# Patient Record
Sex: FEMALE | Race: WHITE | NOT HISPANIC OR LATINO | Employment: FULL TIME | URBAN - METROPOLITAN AREA
[De-identification: names, ages, dates, MRNs, and addresses within clinical notes are randomized per-mention and may not be internally consistent; named-entity substitution may affect disease eponyms.]

---

## 2024-03-21 ENCOUNTER — OFFICE VISIT (OUTPATIENT)
Dept: UROLOGY | Facility: CLINIC | Age: 29
End: 2024-03-21
Payer: COMMERCIAL

## 2024-03-21 DIAGNOSIS — R31.9 HEMATURIA, UNSPECIFIED TYPE: Primary | ICD-10-CM

## 2024-03-21 PROCEDURE — 99213 OFFICE O/P EST LOW 20 MIN: CPT | Performed by: NURSE PRACTITIONER

## 2024-03-21 PROCEDURE — 81001 URINALYSIS AUTO W/SCOPE: CPT

## 2024-03-21 PROCEDURE — 87086 URINE CULTURE/COLONY COUNT: CPT

## 2024-03-21 NOTE — PROGRESS NOTES
[facility]  Outpatient Clinic Note    Subjective   Vicki Garcia is a 28 y.o. female NPV    History of Present Illness   Last Urology visit with Dr. Perry 08/12/2021. History of chronic pelvic pain, microscopic hematuria with benign workup, and recurrent UTI symptoms.  Previously prescribed Nitrofurantoin 100 mg daily x 3 months for suppression therapy. She has taken AZO   In the past for symptom relief. EMR reviewed, No record of Positive Urine Cultures   Sexually active, has IUD with occasional spotting. Chronic pelvic, bladder pain is affecting sexual relations.  Urinalysis today shows Moderate blood    Past Medical History and Surgical History   No past medical history on file.  No past surgical history on file.    Medications  No current outpatient medications on file prior to visit.     No current facility-administered medications on file prior to visit.       Objective   Physicial Exam  General: Well developed, well nourished, alert and cooperative, appears in no acute distress  Eyes: Non-injected conjunctiva, sclera clear, no proptosis  Cardiac: Extremities are warm and well perfused. No edema, cyanosis or pallor.   Lungs: Breathing is easy, non-labored. Speaking in clear and complete sentences. Normal diaphragmatic movement.  MSK: Ambulatory with steady gait, unassisted  Neuro: alert and oriented to person, place and time  Psych: Demonstrates good judgement and reason, without hallucinations, abnormal affect or abnormal behaviors.  Skin: no obvious lesions, no rashes.    Review of Systems  All other systems have been reviewed and are negative for complaint.      Assessment and Plan   Last Urology visit with Dr. Perry 08/12/2021. History of chronic pelvic pain, microscopic hematuria with benign workup, and recurrent UTI symptoms.  Previously prescribed Nitrofurantoin 100 mg daily x 3 months for suppression therapy. She has taken AZO   In the past for symptom relief. EMR reviewed, No record of  Positive Urine Cultures   Sexually active, has IUD with occasional spotting. Chronic pelvic, bladder pain is affecting sexual relations.  Urinalysis today shows Moderate blood    According to the [employer], microscopic hematuria is defined by the presence of three or more red blood cells per high-powered field on microscopic examination of one properly collected, non-contaminated urinalysis with no evidence of infection.    We will send your urine today for microscopic urinalysis and you will be contacted with the results. If microscopic urinalysis reveals true microscopic hematuria, we will plan for a diagnostic hematuria workup including: urine culture, cytology, CT Urogram and cystoscopy. Patient is aware of the risks associated with IV contrast. There is no history of renal dysfunction. Risks of cystoscopy were discussed with the patient in great detail, including risk of hematuria, UTI and discomfort.    She declines further imaging and would like to defer cystoscopy at this time  Patient would like to establish care with Pallavi Byers CNP (prefers female) for further evaluation     All questions and concerns were addressed. Patient verbalizes understanding and has no other questions at this time.   If you have any questions about your care, do not hesitate to call and leave a message, we return calls in a timely manner.    Clare Dominguez-- GABRIELLE MURRELL  Office Phone:  [phone]

## 2024-03-22 LAB
APPEARANCE UR: ABNORMAL
BACTERIA #/AREA URNS AUTO: ABNORMAL /HPF
BILIRUB UR STRIP.AUTO-MCNC: NEGATIVE MG/DL
COLOR UR: ABNORMAL
GLUCOSE UR STRIP.AUTO-MCNC: NORMAL MG/DL
KETONES UR STRIP.AUTO-MCNC: NEGATIVE MG/DL
LEUKOCYTE ESTERASE UR QL STRIP.AUTO: NEGATIVE
MUCOUS THREADS #/AREA URNS AUTO: ABNORMAL /LPF
NITRITE UR QL STRIP.AUTO: NEGATIVE
PH UR STRIP.AUTO: 5.5 [PH]
PROT UR STRIP.AUTO-MCNC: ABNORMAL MG/DL
RBC # UR STRIP.AUTO: ABNORMAL /UL
RBC #/AREA URNS AUTO: ABNORMAL /HPF
SP GR UR STRIP.AUTO: 1.01
SQUAMOUS #/AREA URNS AUTO: ABNORMAL /HPF
UROBILINOGEN UR STRIP.AUTO-MCNC: NORMAL MG/DL
WBC #/AREA URNS AUTO: ABNORMAL /HPF
YEAST BUDDING #/AREA UR COMP ASSIST: PRESENT /HPF

## 2024-03-23 LAB — BACTERIA UR CULT: NO GROWTH

## 2024-04-04 ENCOUNTER — APPOINTMENT (OUTPATIENT)
Dept: UROLOGY | Facility: CLINIC | Age: 29
End: 2024-04-04
Payer: COMMERCIAL

## 2024-04-29 ENCOUNTER — OFFICE VISIT (OUTPATIENT)
Dept: UROLOGY | Facility: CLINIC | Age: 29
End: 2024-04-29
Payer: COMMERCIAL

## 2024-04-29 VITALS
BODY MASS INDEX: 23.22 KG/M2 | HEART RATE: 72 BPM | DIASTOLIC BLOOD PRESSURE: 70 MMHG | WEIGHT: 123 LBS | SYSTOLIC BLOOD PRESSURE: 114 MMHG | TEMPERATURE: 97.3 F | HEIGHT: 61 IN

## 2024-04-29 DIAGNOSIS — R31.29 MICROHEMATURIA: Primary | ICD-10-CM

## 2024-04-29 DIAGNOSIS — R10.2 PELVIC PAIN: ICD-10-CM

## 2024-04-29 DIAGNOSIS — R35.0 FREQUENCY OF MICTURITION: ICD-10-CM

## 2024-04-29 DIAGNOSIS — M62.89 HIGH-TONE PELVIC FLOOR DYSFUNCTION: ICD-10-CM

## 2024-04-29 DIAGNOSIS — R30.0 DYSURIA: ICD-10-CM

## 2024-04-29 DIAGNOSIS — N94.10 DYSPAREUNIA IN FEMALE: ICD-10-CM

## 2024-04-29 LAB
POC APPEARANCE, URINE: CLEAR
POC BILIRUBIN, URINE: NEGATIVE
POC BLOOD, URINE: ABNORMAL
POC COLOR, URINE: YELLOW
POC GLUCOSE, URINE: NEGATIVE MG/DL
POC KETONES, URINE: NEGATIVE MG/DL
POC LEUKOCYTES, URINE: NEGATIVE
POC NITRITE,URINE: NEGATIVE
POC PH, URINE: 5.5 PH
POC PROTEIN, URINE: NEGATIVE MG/DL
POC SPECIFIC GRAVITY, URINE: >=1.03
POC UROBILINOGEN, URINE: 0.2 EU/DL

## 2024-04-29 PROCEDURE — 1036F TOBACCO NON-USER: CPT | Performed by: NURSE PRACTITIONER

## 2024-04-29 PROCEDURE — 81003 URINALYSIS AUTO W/O SCOPE: CPT | Performed by: NURSE PRACTITIONER

## 2024-04-29 PROCEDURE — 99204 OFFICE O/P NEW MOD 45 MIN: CPT | Performed by: NURSE PRACTITIONER

## 2024-04-29 RX ORDER — TROSPIUM CHLORIDE ER 60 MG/1
60 CAPSULE ORAL
Qty: 30 CAPSULE | Refills: 11 | Status: SHIPPED | OUTPATIENT
Start: 2024-04-29 | End: 2025-04-29

## 2024-04-29 RX ORDER — LEVONORGESTREL 13.5 MG/1
INTRAUTERINE DEVICE INTRAUTERINE
COMMUNITY

## 2024-04-29 NOTE — PATIENT INSTRUCTIONS
Trospium ER 60 mg an hour before breakfast  Pelvic floor physical therapy  Discussed pelvic suppositories will consider  Pelvic exam next visit, will consider if estrogen/testosterone needed  Nurse line [phone]

## 2024-04-29 NOTE — PROGRESS NOTES
"04/29/24   [id number]    Chronic pelvic pain     Subjective      HPI Vicki Garcia is a 28 y.o. female who presents for chronic pelvic pain, hx pain for 8 yrs, saw Dr. Perry years ago (7/2021)  but didn't follow up; feels like she has UTI all the time, sometimes better if stays hydrated, burning w urination and stays longer, always some discomfort  w intimacy, last few mos symptoms worsening; urgicare for UTI few mos ago, culture negative;     DTF 10 x, NTF 0-1 x no leakage    PFPT 8 weeks in [facility] didn't feel it helped but didn't keep up w exercises; pain more w entry but kind of both entry and deep dyspareunia; she wondered if IC; no constipation;     Oral birth control in past; 2-4 yrs at age 16, stopped w cigarrettes;    On menses today, UA mod heme  Saw Clare end of march, believes she was about menses then, has IUD    PMH none, microscopic hematuria w benign work up in past; normal cystoscopy 7/15/21; unremarkable PIERRE 5/8/21;   PSH none  FH thyroid cancer  [employer], customer services      Objective     /70   Pulse 72   Temp 36.3 °C (97.3 °F)   Ht 1.549 m (5' 1\")   Wt 55.8 kg (123 lb)   BMI 23.24 kg/m²    Physical Exam  General: Appears comfortable and in no apparent distress, well nourished  Head: Normocephalic, atraumatic  Neck: trachea midline  Respiratory: respirations unlabored, no wheezes, and no use of accessory muscles  Cardiovascular: at rest no dyspnea, well perfused  Skin: no visible rashes or lesions  Neurologic: grossly intact, oriented to person, place, and time  Psychiatric: mood and affect appropriate  Musculoskeletal: in chair for appt. no difficulty w upper body movement      Assessment/Plan   Problem List Items Addressed This Visit    None  Visit Diagnoses       Microhematuria    -  Primary    Relevant Orders    POCT UA Automated manually resulted (Completed)    Frequency of micturition        Relevant Medications    trospium (Sanctura XR) 60 mg 24 hour capsule    Other " "Relevant Orders    Referral to Physical Therapy    High-tone pelvic floor dysfunction        Relevant Orders    Referral to Physical Therapy    Pelvic pain        Dyspareunia in female        Dysuria              Orders Placed This Encounter   Procedures    Referral to Physical Therapy     Standing Status:   Future     Standing Expiration Date:   10/29/2024     Referral Priority:   Routine     Referral Type:   Consultation     Referral Reason:   Specialty Services Required     Referred to Provider:   Deisi Boone PT     Requested Specialty:   Physical Therapy     Number of Visits Requested:   1    POCT UA Automated manually resulted     Order Specific Question:   Release result to [facility]     Answer:   Immediate [1]         Trospium ER 60 mg an hour before breakfast  Pelvic floor physical therapy  Discussed pelvic suppositories will consider  Pelvic exam next visit, will consider if estrogen/testosterone needed  Nurse line [phone]  4-6 weeks any locations pelvic exam, PVR med response;     Pallavi Byers, APRN-CNP  No results found for: \"GLUCOSE\", \"CALCIUM\", \"NA\", \"K\", \"CO2\", \"CL\", \"BUN\", \"CREATININE\"    "

## 2024-06-12 DIAGNOSIS — R35.0 FREQUENCY OF MICTURITION: ICD-10-CM

## 2024-06-12 RX ORDER — TROSPIUM CHLORIDE ER 60 MG/1
60 CAPSULE ORAL
Qty: 30 CAPSULE | Refills: 0 | Status: SHIPPED | OUTPATIENT
Start: 2024-06-12 | End: 2025-06-12

## 2024-06-12 NOTE — TELEPHONE ENCOUNTER
Pt left message stating she left us a VM a couple weeks back explaining how she misplaced the original rx for Trospium 60 mg printed order and is asking if we can electronically send it to the  on [address]. I do not recall her leaving a message and perhaps it got deleted by accident. Pt does have a fu scheduled with us in a couple weeks. Please send Trospium to  pharmacy, thanks. I put in just a 30 day supply with 0 refills so we can see if it is helping and send more at her fu appt, thanks.

## 2024-06-13 ENCOUNTER — APPOINTMENT (OUTPATIENT)
Dept: PHYSICAL THERAPY | Facility: CLINIC | Age: 29
End: 2024-06-13
Payer: COMMERCIAL

## 2024-06-13 ENCOUNTER — EVALUATION (OUTPATIENT)
Dept: PHYSICAL THERAPY | Facility: HOSPITAL | Age: 29
End: 2024-06-13
Payer: COMMERCIAL

## 2024-06-13 DIAGNOSIS — R35.0 FREQUENCY OF MICTURITION: ICD-10-CM

## 2024-06-13 DIAGNOSIS — M62.838 MUSCLE SPASM: Primary | ICD-10-CM

## 2024-06-13 DIAGNOSIS — M62.89 HIGH-TONE PELVIC FLOOR DYSFUNCTION: ICD-10-CM

## 2024-06-13 PROCEDURE — 97161 PT EVAL LOW COMPLEX 20 MIN: CPT | Mod: GP

## 2024-06-13 PROCEDURE — 97535 SELF CARE MNGMENT TRAINING: CPT | Mod: GP

## 2024-06-13 ASSESSMENT — ENCOUNTER SYMPTOMS
DEPRESSION: 0
LOSS OF SENSATION IN FEET: 0
OCCASIONAL FEELINGS OF UNSTEADINESS: 0

## 2024-06-13 NOTE — PROGRESS NOTES
Physical Therapy    Pelvic Floor Initial Evaluation      Patient Name:Vicki Garcia  MRN:[id number]  Today's Date:6/13/2024  Referred by: Pallavi Byers  Time Calculation  Start Time: 1605  Stop Time: 1705  Time Calculation (min): 60 min    Reason for Visit: pelvic pain    Insurance:  Visit #: 1  Visits Approved: 30  Authorization needed: No   Insurance info: [facility] 50.00 COPAY, 100% COVERAGE    Therapy Diagnosis  1. Muscle spasm    2. Frequency of micturition    3. High-tone pelvic floor dysfunction         Assessment:  Pt is a 28 y.o. female c/o dyspareunia, pain with urination, and general pelvic pain for the last 8 years. Pt presents with decreased PFM strength and endurance, difficulty with full PFM relaxation, +TTP circumferentially at introitus, mod-severe hypertonicity superficially and mod hypertonicity throughout rest of PFM. These impairments are consistent with pelvic floor dysfunction and is affecting her tolerance to internal penetration during medical exams/intercourse as well affecting her QoL. Pt is appropriate for skilled PFPT services to address these impairments and return to PLOF.     Problem List: activity limitations, ADLs/IADLs/self care skills, decreased functional level, decreased knowledge of HEP, flexibility, pain, range of motion/joint mobility and strength.     Clinical Presentation: Stable    Level of Complexity: Low     Plan:   Treatment/Interventions: Biofeedback, Cryotherapy, Dry needling, Education/ Instruction, Electrical stimulation, Hot pack, Manual therapy, Mechanical traction, Neuromuscular re-education, Self care/ home management, Taping techniques, Therapeutic activities, Therapeutic exercises, Ultrasound, Vasopneumatic device  PT Plan: Skilled PT  PT Frequency:  1x/week  Duration: 12 weeks  Rehab Potential: Good  Plan of Care Agreement: Patient    Next visit: diaphragmatic breathing, mindfulness, vaginal dilator use, manual internal stretching     Goals:  The pt will achieve  the following goals, in 12 weeks:  Pt will be independent in HEP to maximize PT benefits.   Pt will improve NIH CPSI raw score >/= 7 pts to meet MCID and demonstrate improved QoL.   Pt will improve BLE strength to 5/5 to improve functional mobility such as with stooping, squatting, and ambulation.   Pt will engage TA without cueing to improve core stability when lifting or carrying heavy objects.  Pt will improve Marinoff Dyspareunia Scale score to 1 to improve tolerance to internal penetration for intercourse and medical examinations.   Pt will improve average and worst pain rating by >/= 2 pts to meet MCID.   Pt will improve PFM strength 5/5 for 10 sec hold and full relaxation to improve pelvic floor mobility for proper voiding and elimination.   Pt will demonstrate knowledge of bladder irritants and proper bladder habits to improve urinary function.  Hypertonicity of PFM will decrease to min-mod.   Pt will not experience pain with urination 95% of the time.     Subjective:    Medical Screening: Reviewed medical history form with patient and medical screening assessed.   Red Flags: Do you have any of the following? No   Fever/chills, unexplained weight changes, dizziness/fainting, unexplained change in bowel or bladder functions, unexplained malaise or muscle weakness, night pain/sweats, numbness or tingling    Precautions:   Fall Risk: None     General:  Chief complaint: dyspareunia, pelvic pain, and pain with urination. Feeling like she has a UTI all the time  Onset: 8 years   Occupation: Customer service [employer]   PLOF: Pt was independent with all ADL's previously.  Functional limitations: Pain with intercourse, medical examinations, and just throughout day.  Exercise: No   Goals: Less pain   Personal factors that may impact care:    Bladder:  Daytime voidinx every hour or two, but when paying attention to water intake can be multiple times an hour  Nighttime voiding: No   Leakage (amount, protection,  activity): Not anything that she has paid a lot of attention to   Urgency: No   Difficulty initiating stream: Sometimes   Incomplete emptying: Can completely bladder, but needs to try a little harder to do it   Post-void dribble: No   Weak stream: No   Dysuria: Yes     Water intake: 1 - 32 oz   Other beverages (type, amount): 1 cup coffee, couple beers at night     Bowel:  Frequency: Every day or every other day   Constipation/straining: No  Diarrhea: No  [place] stool scale: 3-4  Fiber/stool softeners: None    Pain:  1/10 current, 5/10 worst, 0/10 best   Exacerbating: tighter pants, thong, sensation to the vaginal area   Wiping/clothing touching: Yes clothing and only wiping if it is a bad day   Menses: No    Sexual [place]:  [place]: pain with both entrance (more) and deeper penetration vaginally - strong burning sensation   Does soreness last: strong burning does last   Andreyff Dyspareunia Scale: 3 = Intense pain, interrupts intercourse    So bad that she has not really been having intercourse recently  Lubricants: Sometimes, but pretty sensitive   Able to orgasm: Yes - no pain and able     Objective:    Pelvic Floor:  Vaginal Assessment:  Erythema: -  Atrophy: -  Voluntary contraction: Present  Voluntary relaxation: present incomplete   Involuntary contraction/relaxation: present, paradoxical  P: 3/5   E: 5 sec hold   R: 1 reps   F: 10 with increased difficulty with full PFM relaxation after contractions     External palpation: No tenderness with palpation, but did not mild soreness after external palpation vaginally     Internal palpation: +TTP circumferentially and severe hypertonicity superficially at introitus. Mod hypertonicity noted in middle and deep layers, but -TTP.    Outcome Measures:  Other Measures  Other Outcome Measures: NIH CPSI: (pain: 10, urinary: 3, QoL: 8, raw score: 21)     Interventions:  PT Therapeutic Procedures Time Entry  Self-Care/Home Mgmt Trainin  Today's session:  "  Initial evaluation including internal vaginal assessment of pelvic floor muscles - verbal consent obtained   Pt ed on diagnosis, prognosis, goals of PT, expectations, POC  HEP (See below) ed on normal vs. Abnormal response     Education:  Pt educated on IC and sx, role of PFPT, hypertonicity of PFM and impact on sx, diaphragmatic breathing, downtraining nervous system, internal stretching, PFPT POC, vaginal dilator use, and HEP.    Therapeutic exercise:   Happy baby 30\" x 3  Child's pose 30\" x 3   Cat cow x 10         "

## 2024-06-19 ENCOUNTER — APPOINTMENT (OUTPATIENT)
Dept: UROLOGY | Facility: CLINIC | Age: 29
End: 2024-06-19
Payer: COMMERCIAL

## 2024-06-19 VITALS — WEIGHT: 120 LBS | BODY MASS INDEX: 22.66 KG/M2 | HEIGHT: 61 IN

## 2024-06-19 DIAGNOSIS — M62.89 HIGH-TONE PELVIC FLOOR DYSFUNCTION: ICD-10-CM

## 2024-06-19 DIAGNOSIS — N94.10 DYSPAREUNIA IN FEMALE: ICD-10-CM

## 2024-06-19 DIAGNOSIS — R35.0 URINARY FREQUENCY: ICD-10-CM

## 2024-06-19 DIAGNOSIS — R10.2 PELVIC PAIN: ICD-10-CM

## 2024-06-19 DIAGNOSIS — R30.0 DYSURIA: Primary | ICD-10-CM

## 2024-06-19 LAB
POC APPEARANCE, URINE: ABNORMAL
POC BILIRUBIN, URINE: NEGATIVE
POC BLOOD, URINE: ABNORMAL
POC COLOR, URINE: YELLOW
POC GLUCOSE, URINE: NEGATIVE MG/DL
POC KETONES, URINE: ABNORMAL MG/DL
POC LEUKOCYTES, URINE: NEGATIVE
POC NITRITE,URINE: NEGATIVE
POC PH, URINE: 5.5 PH
POC PROTEIN, URINE: NEGATIVE MG/DL
POC SPECIFIC GRAVITY, URINE: <=1.005
POC UROBILINOGEN, URINE: 0.2 EU/DL

## 2024-06-19 PROCEDURE — 81003 URINALYSIS AUTO W/O SCOPE: CPT

## 2024-06-19 PROCEDURE — 99214 OFFICE O/P EST MOD 30 MIN: CPT | Performed by: NURSE PRACTITIONER

## 2024-06-19 PROCEDURE — 81003 URINALYSIS AUTO W/O SCOPE: CPT | Performed by: NURSE PRACTITIONER

## 2024-06-19 PROCEDURE — 1036F TOBACCO NON-USER: CPT | Performed by: NURSE PRACTITIONER

## 2024-06-19 PROCEDURE — 87086 URINE CULTURE/COLONY COUNT: CPT

## 2024-06-19 NOTE — PROGRESS NOTES
"06/19/24   [id number]    Chronic pelvic pain, pelvic exam, med response     Subjective      HPI Vicki Garcia is a 28 y.o. female who presents follow up chronic pelvic pain, pelvic exam, med response. High tone pelvic floor told by PFPT, has gone back to PFPT since last visit; has had these issues for 8 yrs. Partner of 4 yrs very understanding.     4-5 yrs on oral birth control, now IUD past 6 yrs.    On trospium past week, constipated, dry mouth    PVR 0 ml,     Long hx microscopic hematuria, told benign, PIERRE and cystoscopy in past two years;    Cystoscopy July 2021 w Dr. 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 Perry years ago (7/2021)  but didn't follow up; feels like she has UTI all the time, sometimes better if stays hydrated, burning w urination and stays longer, always some discomfort  w intimacy, last few mos symptoms worsening; urgicare for UTI few mos ago, culture negative;     DTF 10 x, NTF 0-1 x no leakage    PFPT 8 weeks in [facility] didn't feel it helped but didn't keep up w exercises; pain more w entry but kind of both entry and deep dyspareunia; she wondered if IC; no constipation;     Oral birth control in past; 2-4 yrs at age 16, stopped w cigarrettes;        Objective     Ht 1.549 m (5' 1\")   Wt 54.4 kg (120 lb)   BMI 22.67 kg/m²    Physical Exam  Genitourinary:     Comments: No vulvar lesions, Positive Qtip tenderness at vestibule no atrophy  Neg CST lying down, Positive levator ani tenderness or tightness, especially the band at opening around vagina;  No cystocele, no rectocele or uterine prolapse  Non tender ovaries/uterus, difficult exam d/t  body habitus   No rectal exam done        General: Appears comfortable and " "in no apparent distress, well nourished  Head: Normocephalic, atraumatic  Neck: trachea midline  Respiratory: respirations unlabored, no wheezes, and no use of accessory muscles  Cardiovascular: at rest no dyspnea, well perfused  Skin: no visible rashes or lesions  Neurologic: grossly intact, oriented to person, place, and time  Psychiatric: mood and affect appropriate  Musculoskeletal: in chair for appt. no difficulty w upper body movement      Assessment/Plan   Problem List Items Addressed This Visit    None  Visit Diagnoses       Dysuria    -  Primary    Relevant Orders    Urine Culture    Urinalysis with Reflex Microscopic    Urinary frequency        Relevant Orders    POCT UA Automated manually resulted (Completed)    Post-Void Residual (Completed)    Pelvic pain        High-tone pelvic floor dysfunction        Dyspareunia in female              Orders Placed This Encounter   Procedures    Post-Void Residual    Urine Culture     Order Specific Question:   Release result to MyChart     Answer:   Immediate [1]    Urinalysis with Reflex Microscopic     Order Specific Question:   Release result to MyChart     Answer:   Immediate [1]    POCT UA Automated manually resulted     Order Specific Question:   Release result to MyChart     Answer:   Immediate [1]        Continue the trospium  Stool softener, colace twice daily or miraLAX titrate in morning  Psyllium fiber capsules extra fiber capsules, cookies, gummies    Pelvic pain   Pelvic floor physical therapy, has used dilators in past if needed  Pelvic pain suppositories  Amitriptyline-failed in past    Discussed testosterone/estrogen cream to vesibule, will hold off for now  As trying other things first    Discussed bladder instillations if needed    Follow up Pallavi 3 mos  Nurse line [phone]  3 mos pelvic pain, office or virtusl, virtual is 4/5th friday    Pallavi Byers, APRN-CNP  No results found for: \"GLUCOSE\", \"CALCIUM\", \"NA\", \"K\", \"CO2\", \"CL\", \"BUN\", " "\"CREATININE\"    "

## 2024-06-19 NOTE — PATIENT INSTRUCTIONS
Continue the trospium  Stool softener, colace twice daily or miraLAX titrate in morning  Psyllium fiber capsules extra fiber capsules, cookies, gummies    Pelvic pain   Pelvic floor physical therapy, has used dilators in past if needed  Pelvic pain suppositories  Amitriptyline-failed in past    Discussed testosterone/estrogen cream to vesibule, will hold off for now  As trying other things first    Discussed bladder instillations if needed    Follow up Pallavi 3 mos  Nurse line [phone]

## 2024-06-20 LAB
APPEARANCE UR: CLEAR
BACTERIA UR CULT: NO GROWTH
BILIRUB UR STRIP.AUTO-MCNC: NEGATIVE MG/DL
COLOR UR: COLORLESS
GLUCOSE UR STRIP.AUTO-MCNC: NORMAL MG/DL
KETONES UR STRIP.AUTO-MCNC: ABNORMAL MG/DL
LEUKOCYTE ESTERASE UR QL STRIP.AUTO: NEGATIVE
NITRITE UR QL STRIP.AUTO: NEGATIVE
PH UR STRIP.AUTO: 5.5 [PH]
PROT UR STRIP.AUTO-MCNC: NEGATIVE MG/DL
RBC # UR STRIP.AUTO: NEGATIVE /UL
SP GR UR STRIP.AUTO: 1
UROBILINOGEN UR STRIP.AUTO-MCNC: NORMAL MG/DL

## 2024-06-25 ENCOUNTER — TELEPHONE (OUTPATIENT)
Dept: UROLOGY | Facility: CLINIC | Age: 29
End: 2024-06-25
Payer: COMMERCIAL

## 2024-06-25 NOTE — TELEPHONE ENCOUNTER
"Pt left 2 different messages today stating that she has a few questions to ask Pallavi. Her first is regarding the pain suppositories. She thought you said that she can take it up to every night as needed but what about sex. Should she wait and insert one after sex or is it ok to insert that first and then have sex or should she completely avoid the suppositories if she sexual active that day?    Her second concern is regarding the Trospium and having to take Colace or Miralax. She read that it is only recommended to take those products for one week and to consult a dr if using long term. She is anxious because she is not a \"pill popper\" and doesn't really want to take all this medication. Please call pt back at your earliest convenience to discuss, thanks.  "

## 2024-07-22 ENCOUNTER — TREATMENT (OUTPATIENT)
Dept: PHYSICAL THERAPY | Facility: HOSPITAL | Age: 29
End: 2024-07-22
Payer: COMMERCIAL

## 2024-07-22 DIAGNOSIS — M62.838 MUSCLE SPASM: ICD-10-CM

## 2024-07-22 PROCEDURE — 97140 MANUAL THERAPY 1/> REGIONS: CPT | Mod: GP

## 2024-07-22 PROCEDURE — 97110 THERAPEUTIC EXERCISES: CPT | Mod: GP

## 2024-07-22 NOTE — PROGRESS NOTES
Physical Therapy Treatment    Patient Name:Vicki Garcia  MRN:[id number]  Today's Date:2024  Referred by: Pallavi Byers  Time Calculation  Start Time: 820  Stop Time: 920  Time Calculation (min): 60 min    Reason for Visit: pelvic pain    Insurance:  Visit #: 2  Visits Approved: 30  Authorization needed: No   Insurance info: [facility] 50.00 COPAY, 100% COVERAGE    Therapy Diagnosis  1. Muscle spasm         Assessment:  Pt reports some improvements in overall sx with prescriptions of tropsium and suppositories. Half of session focused on a lot of education (see below). Pt verbalized understanding and all questions were answered prior to leaving appt. Second half of session focused on manual internal stretching and TPR. +TTP circumferentially at introitus, but more tenderness noted 9-11 o'clock. Pt reports 3-4/10 pain at end of session following manual therapy. Pt is appropriate for continued skilled PFPT services to address these impairments and improve QoL.      Plan:  Check in with internal stretching and dilator use.   Progress strengthening and stretching as tolerated.     Precautions:  Fall Risk: None     Subjective:  Pt states that she has been on the tropsium that she feels has improved some of the pain level daily. She has been doing the stretches and she notices that she is pretty sore in her legs. She has been a little more constipated because of the tropsium, but has been on fiber that has helped. Has been using the proper elimination that has helped as well. No pain today. Has not been having a lot of intercourse since last visit, so not sure if sx have changed.     Pain:   Ratin/10 at beginning of session, 3-4/10 at end of session    HEP Compliance: Yes     Objective:  +TTP circumferentially at introitus, but more tenderness noted around 9-11 o'clock     Interventions:  PT Therapeutic Procedures Time Entry  Manual Therapy Time Entry: 30  Therapeutic Exercise Time Entry: 30    Education:  Pt educated  on fiber, increasing water intake when taking fiber, magnesium complexes, proper elimination position, role of stretches, vaginal dilator use, internal stretching and parameters for home, psychosocial connection with PFD, causes of pelvic pain, lubricants, and expectations for POC.    Therapeutic exercise:     Manual Therapy: 30 minutes  Internal stretching with vaginal clock   TPR B OI     Neuromuscular Re-ed:   minutes    Gait Training:   minutes    Modalities   Vasopneumatic Device       minutes  Electrical Stimulation          minutes  Ultrasound                      minutes  Iontophoresis                     minutes  Cold Pack                        minutes  Mechanical Traction           minutes

## 2024-07-29 ENCOUNTER — TREATMENT (OUTPATIENT)
Dept: PHYSICAL THERAPY | Facility: HOSPITAL | Age: 29
End: 2024-07-29
Payer: COMMERCIAL

## 2024-07-29 DIAGNOSIS — M62.838 MUSCLE SPASM: ICD-10-CM

## 2024-07-29 PROCEDURE — 97535 SELF CARE MNGMENT TRAINING: CPT | Mod: GP

## 2024-07-29 PROCEDURE — 97110 THERAPEUTIC EXERCISES: CPT | Mod: GP

## 2024-07-29 NOTE — PROGRESS NOTES
Physical Therapy Treatment    Patient Name:Vicki Garcia  MRN:[id number]  Today's Date:2024  Referred by: Pallavi Byers  Time Calculation  Start Time: 840  Stop Time: 935  Time Calculation (min): 55 min    Reason for Visit: pelvic pain    Insurance:  Visit #: 3  Visits Approved: 30  Authorization needed: No   Insurance info: [facility] 50.00 COPAY, 100% COVERAGE    Therapy Diagnosis  1. Muscle spasm         Assessment:  Pt tolerated session well with no increases in pain. First half of session dedicated to education on importance of vaginal dilator use at home alongside other questions pt had. Second half of session focused on core and hip strengthening to improve hypertonicity of PFM. Pt engages TA well in HL. HEP updated. Pt is appropriate for continued skilled PFPT services to address these impairments and improve QoL.      Plan:  Check in with dilator use at home.   Consider manual TPR and internal stretching next session.   Continue with stretching and strengthening, progress as tolerated.     Precautions:  Fall Risk: None     Subjective:  Pt states that she felt okay after last session with no worsening or improvement of pain. She states that she forgot to take her fiber the other day, so her BM have been a little off. She is also on her period, so just not feeling great. Tried the dilators this week a couple of times. She tried with the lubricant samples and good clean love caused some burning, but liked uberlube that did not cause any discomfort. With dilator use, noting introital discomfort the most but once in feels alright. Had intercourse and had pain mostly at the entrance, but did notice discomfort throughout the whole process. States that she does still enjoy intercourse, but does find it to be painful.    Pain:   Ratin/10 at beginning of session    HEP Compliance: Yes     Objective:  Good TA activation in HL     Interventions:  PT Therapeutic Procedures Time Entry  Therapeutic Exercise Time  "Entry: 30  Self-Care/Home Mgmt Trainin    Education: (charged as self care/home mgmt)  Pt educated on vaginal dilator parameters and use, down training ANS with dilator use, when to buy set of dilators, home monitoring sx when using vaginal dilators, water based lubricants and silicone based lubricants, strengthening hip/core to improve hypertonicity of PFM mm, TA anatomy/physiology, TA and PFM synergy, and HEP updated.     Therapeutic exercise:   Butterfly stretch 30\" x 3   Bridges 5\" x 10 x 2   HL TA 5\" x 10 x 2   HL TA + BKFO x 10 x 2 R/L   Clamshells 5\" x 10 x 2 R/L     Manual Therapy:   minutes      Neuromuscular Re-ed:   minutes    Gait Training:   minutes    Modalities   Vasopneumatic Device       minutes  Electrical Stimulation          minutes  Ultrasound                      minutes  Iontophoresis                     minutes  Cold Pack                        minutes  Mechanical Traction           minutes      "

## 2024-08-08 DIAGNOSIS — R35.0 FREQUENCY OF MICTURITION: Primary | ICD-10-CM

## 2024-08-08 RX ORDER — MIRABEGRON 50 MG/1
50 TABLET, EXTENDED RELEASE ORAL DAILY
Qty: 30 TABLET | Refills: 11 | Status: SHIPPED | OUTPATIENT
Start: 2024-08-08 | End: 2025-08-08

## 2024-08-08 NOTE — PROGRESS NOTES
Patient called in, constipation is not under control, will switch to myrbetriq, will monitor bp daily first week, then once weekly first month, will wean off trospium as myrbetriq begins to work,  discussed momentum program. Will try suppositories a bit more as things calm down, perhaps timing also contributing to burning; Could also consider compounding in different base if needed.  She is doing PFPT.  
There are no Wet Read(s) to document.

## 2024-08-29 ENCOUNTER — TREATMENT (OUTPATIENT)
Dept: PHYSICAL THERAPY | Facility: HOSPITAL | Age: 29
End: 2024-08-29
Payer: COMMERCIAL

## 2024-08-29 DIAGNOSIS — M62.838 MUSCLE SPASM: ICD-10-CM

## 2024-08-29 PROCEDURE — 97535 SELF CARE MNGMENT TRAINING: CPT | Mod: GP

## 2024-08-29 PROCEDURE — 97140 MANUAL THERAPY 1/> REGIONS: CPT | Mod: GP

## 2024-08-29 NOTE — PROGRESS NOTES
Physical Therapy Treatment    Patient Name:Vicki Garcia  MRN:[id number]  Today's Date:2024  Referred by: Pallavi Byers  Time Calculation  Start Time: 836  Stop Time: 930  Time Calculation (min): 54 min    Reason for Visit: pelvic pain    Insurance:  Visit #: 4  Visits Approved: 30  Authorization needed: No   Insurance info: [facility] 50.00 COPAY, 100% COVERAGE    Therapy Diagnosis  1. Muscle spasm         Assessment:  Pt presents to PFPT today with frustrations regarding lack of progress and continued discomfort with intercourse. Discussed at length expectations and POC alongside importance of compliance with HEP to see good results. Pt tolerated rest of session well with manual DTM/TPR to external hip mm. +TTP B adductors L>R and distal>prox, B iliopsoas L>R, and B hip ERs L>R. Pt responded well with reported improvements in sx at end of session. Pt is appropriate for continued skilled PFPT services to address these impairments and improve QoL.      Plan:  Continue with manual next session: internal TPR and stretching, IASTM to external hip mm, pudendal nerve glide.     Precautions:  Fall Risk: None     Subjective:  Pt states that her pain has gotten worse since she stopped taking the tropspium and has not gotten on any other medication. Pt states that she has not been using the dilator consistently, but has been doing it for the last week. She bought a dilator set and some days it is not painful, but other days she is in too much pain to start with the level she was at before.     Pain:   Rating: 3/10 at beginning of session    HEP Compliance: Yes     Objective:  +TTP B adductors L>R and distal>prox, B iliopsoas L>R, B hip ERs L>R      Interventions:  PT Therapeutic Procedures Time Entry  Manual Therapy Time Entry: 30  Self-Care/Home Mgmt Trainin    Education: (charged as self care/home mgmt)  Pt educated on vaginal dilator use in regards to levels/sizes, warming up for intercourse with internal stretching  or yoga stretches, expectations for PFPT and monitoring progress, botox injections in PFM as medical intervention, hip musculature and impact of PFM, and POC.    Therapeutic exercise:     Manual Therapy: 30 minutes  DTM/TPR B adductors   DTM/TPR B iliopsoas   DTM/TPR B hip ERs     Neuromuscular Re-ed:   minutes    Gait Training:   minutes    Modalities   Vasopneumatic Device       minutes  Electrical Stimulation          minutes  Ultrasound                      minutes  Iontophoresis                     minutes  Cold Pack                        minutes  Mechanical Traction           minutes

## 2024-09-11 ENCOUNTER — APPOINTMENT (OUTPATIENT)
Dept: OBSTETRICS AND GYNECOLOGY | Facility: CLINIC | Age: 29
End: 2024-09-11
Payer: COMMERCIAL

## 2024-09-11 ENCOUNTER — TREATMENT (OUTPATIENT)
Dept: PHYSICAL THERAPY | Facility: HOSPITAL | Age: 29
End: 2024-09-11
Payer: COMMERCIAL

## 2024-09-11 VITALS
HEIGHT: 61 IN | DIASTOLIC BLOOD PRESSURE: 62 MMHG | WEIGHT: 110 LBS | BODY MASS INDEX: 20.77 KG/M2 | SYSTOLIC BLOOD PRESSURE: 102 MMHG

## 2024-09-11 DIAGNOSIS — M62.838 MUSCLE SPASM: ICD-10-CM

## 2024-09-11 DIAGNOSIS — Z30.011 ENCOUNTER FOR INITIAL PRESCRIPTION OF CONTRACEPTIVE PILLS: ICD-10-CM

## 2024-09-11 DIAGNOSIS — Z30.432 ENCOUNTER FOR REMOVAL OF INTRAUTERINE CONTRACEPTIVE DEVICE (IUD): Primary | ICD-10-CM

## 2024-09-11 PROCEDURE — 97140 MANUAL THERAPY 1/> REGIONS: CPT | Mod: GP

## 2024-09-11 PROCEDURE — 97535 SELF CARE MNGMENT TRAINING: CPT | Mod: GP

## 2024-09-11 RX ORDER — NORETHINDRONE ACETATE AND ETHINYL ESTRADIOL 1MG-20(21)
1 KIT ORAL DAILY
Qty: 84 TABLET | Refills: 3 | Status: SHIPPED | OUTPATIENT
Start: 2024-09-11 | End: 2025-09-11

## 2024-09-11 ASSESSMENT — PAIN SCALES - GENERAL: PAINLEVEL: 1

## 2024-09-11 NOTE — PROGRESS NOTES
Patient ID: Vicki Garcia is a 28 y.o. female.  Pt here for IUD removal. Pt has a galo IUD which is about to .   Pt reports history of chronic pelvic pain. She would like to try different method of BC to see if it helps her chronic pelvic pain. She has used NOA in the past and did not have any problems.  She denies PE/DVT, migraines, HTN.  She does vape daily.    Pt is sexually active with one male partner (arvin)      IUD Removal    Performed by: Dona MACARIO MD  Authorized by: Dona MACARIO MD    Procedure: IUD removal    Consent obtained by patient, parent, or legal power of  - including discussion of procedure risks and benefits, patient questions answered, and patient education provided: yes    Reason for removal: patient request    Strings visualized: yes    IUD grasped by forceps: yes    IUD removed: yes    Date/Time of Removal:  2024 3:42 PM  Removed without complications: yes    IUD intact: yes        IUD removed without difficulty or complication.  Reviewed NOA side effects and how to take.  Rx sent.     Pt will cont to follow with Urogyn and/or PFPT for her chronic pelvic pain.

## 2024-09-11 NOTE — PROGRESS NOTES
Physical Therapy Treatment    Patient Name:Vicki Garcia  MRN:[id number]  Today's Date:2024  Referred by: Pallavi Byers  Time Calculation  Start Time: 823  Stop Time: 916  Time Calculation (min): 53 min    Reason for Visit: pelvic pain    Insurance:  Visit #: 5  Visits Approved: 30  Authorization needed: No   Insurance info: [facility] 50.00 COPAY, 100% COVERAGE    Therapy Diagnosis  1. Muscle spasm         Assessment:  Pt continues to report frustration with lack of progress. Pt tolerated session well. Discussed POC moving forward and options for the next couple of sessions. +TTP at superficial layer of PFM, but improved with sustained stretching. Pt reports slight worsening of sx following manual to 2/10. Pt is appropriate for a couple more continued skilled PFPT services to address these impairments and improve QoL, if pt continues to report lack of progress will refer back to provider to discuss other options.     Plan:  Check in with internal stretching and TPR - how she feels   Continue with manual next session: IASTM to external hip mm, pudendal nerve glide.     Precautions:  Fall Risk: None     Subjective:  Pt states she feels okay today regarding pelvic pain. Still inconsistent with pain having some good days and bad days. Pain is worse when she is not hydrated or if she drinks alcohol. [place] still painful, but has not implemented the stretching beforehand. Partner is helping with internal stretching regularly, but no changes in over dyspareunia despite improvements in overal mm tension.    Pain:   Ratin/10 at beginning of session, 2/10 at end of session following manual     HEP Compliance: Yes     Objective:  +TTP throughout PFM circumferentially at superficial layer      Interventions:  PT Therapeutic Procedures Time Entry  Manual Therapy Time Entry: 38  Self-Care/Home Mgmt Training: 15    Education: (charged as self care/home mgmt)  Pt educated on normalization of emotions and anxiety  increasing pain, expectations for PT, mediation/mindfulness during dilator use, and updated POC and plan for remainder of scheduled sessions.     Therapeutic exercise:     Manual Therapy: 38 minutes  Internal stretching around vaginal clock superficially   Internal TPR superficial and deep     Neuromuscular Re-ed:   minutes    Gait Training:   minutes    Modalities   Vasopneumatic Device       minutes  Electrical Stimulation          minutes  Ultrasound                      minutes  Iontophoresis                     minutes  Cold Pack                        minutes  Mechanical Traction           minutes

## 2024-09-18 ENCOUNTER — TREATMENT (OUTPATIENT)
Dept: PHYSICAL THERAPY | Facility: HOSPITAL | Age: 29
End: 2024-09-18
Payer: COMMERCIAL

## 2024-09-18 DIAGNOSIS — M62.838 MUSCLE SPASM: ICD-10-CM

## 2024-09-18 PROCEDURE — 97140 MANUAL THERAPY 1/> REGIONS: CPT | Mod: GP

## 2024-09-18 NOTE — PROGRESS NOTES
Physical Therapy Treatment    Patient Name:Vicki Garcia  MRN:[id number]  Today's Date:2024  Referred by: Pallavi Byers  Time Calculation  Start Time: 837  Stop Time: 932  Time Calculation (min): 55 min    Reason for Visit: pelvic pain    Insurance:  Visit #: 6  Visits Approved: 30  Authorization needed: No   Insurance info: [facility] 50.00 COPAY, 100% COVERAGE    Therapy Diagnosis  1. Muscle spasm         Assessment:  Pt tolerated session well with IASTM and dynamic cupping of B adductors. +TTP B adductors distal>prox with both fascial and musculature restrictions. Improved with continued manual by end of session. Will recheck next visit and consider updated POC and referral back to referring provider.      Plan:  Recheck next visit and consider referral back to Pallavi Byers if still reporting consistent sx.     Precautions:  Fall Risk: None     Subjective:  Pt states that she felt fine after internal last session with no unusual soreness or pain. No pain right now, but still getting pain daily. She is still noting a connection between hydration with pain, but unable to pin point what worsens pain. She did perform internal stretching before intercourse which seemed to help make intercourse much more tolerable. No pain initially, but then after a certain amount of time it was uncomfortable again. This is an improvement as before she was having intercourse and pain was present all the time. She was a little sore after intercourse. She got her IUD removed last week.     Pain:   Ratin/10    HEP Compliance: Yes     Objective:  +TTP B adductors distal > prox - fascial and muscular     Interventions:  PT Therapeutic Procedures Time Entry  Manual Therapy Time Entry: 55    Education:   Pt educated on updated POC and plan for next session.     Therapeutic exercise:     Manual Therapy: 55 minutes  IASTM B adductors x 30'  Dynamic cupping B adductors x 25'

## 2024-09-25 ENCOUNTER — TREATMENT (OUTPATIENT)
Dept: PHYSICAL THERAPY | Facility: HOSPITAL | Age: 29
End: 2024-09-25
Payer: COMMERCIAL

## 2024-09-25 DIAGNOSIS — M62.838 MUSCLE SPASM: ICD-10-CM

## 2024-09-25 PROCEDURE — 97110 THERAPEUTIC EXERCISES: CPT | Mod: GP

## 2024-09-25 PROCEDURE — 97140 MANUAL THERAPY 1/> REGIONS: CPT | Mod: GP

## 2024-09-25 NOTE — PROGRESS NOTES
Physical Therapy Treatment    Patient Name:Vicki Garcia  MRN:[id number]  Today's Date:9/25/2024  Referred by: Pallavi Byers  Time Calculation  Start Time: 0835  Stop Time: 0920  Time Calculation (min): 45 min    Reason for Visit: pelvic pain    Insurance:  Visit #: 7  Visits Approved: 30  Authorization needed: No   Insurance info: [facility] 50.00 COPAY, 100% COVERAGE    Therapy Diagnosis  1. Muscle spasm         Assessment:  Pt reassessed today with very minimal improvements in sx. Pt still consistently reports pain with urination and pelvic pain daily. Pt still have discomfort with intercourse that has improved slightly with internal stretching beforehand, but not consistent. Pt has met some goals, but mostly has not met goals and is very slowly progressing. POC consisted of many different interventions and pt did not seem to get much relief from it. D/t lack of significant progress in last 3 months, it was agreed that pt will be discharged and follow up with referring provider. Pt instructed to continue with HEP, dilator, and internal stretching at home. Pt verbalized understanding and agreeable to this updated POC. All questions answered prior to leaving appt. Pt educated on how to seek out additional services PRN.     Goals:  The pt will achieve the following goals, in 12 weeks: (reassessed on visit 7 on 9/25/25)  Pt will be independent in HEP to maximize PT benefits. MET  Pt will improve NIH CPSI raw score >/= 7 pts to meet MCID and demonstrate improved QoL. PROGRESSING - 40%  Pt will improve BLE strength to 5/5 to improve functional mobility such as with stooping, squatting, and ambulation. MET  Pt will engage TA without cueing to improve core stability when lifting or carrying heavy objects. MET  Pt will improve Marinoff Dyspareunia Scale score to 1 to improve tolerance to internal penetration for intercourse and medical examinations. NOT MET   Pt will improve average and worst pain rating by >/= 2 pts to meet  MCID. NOT MET   Pt will improve PFM strength 5/5 for 10 sec hold and full relaxation to improve pelvic floor mobility for proper voiding and elimination. NOT MET - 50%  Pt will demonstrate knowledge of bladder irritants and proper bladder habits to improve urinary function. MET  Hypertonicity of PFM will decrease to min-mod. NOT MET - 50%  Pt will not experience pain with urination 95% of the time. NOT MET - only 30-50%     Plan:  Discharge and follow up with referring provider for lack of progress to discuss other tx options.     Precautions:  Fall Risk: None     Subjective:  No changes in overall sx. She has been on mybetriq that has caused some discomfort and has not significantly changed frequency. Internal stretching has helped a time or two before intercourse, but not great with doing it consistently. Continues to report no bowel dysfunction and urinary frequency despite PFPT and medications. Still reporting pain with urination about 50-70% of the time.    Pain:   1-2/10 current, 4-5/10 worst, 0/10 best   Exacerbating: lack of hydration, alcohol, intercourse, tighter pants and underwear   Wiping/clothing touching: Yes tighter clothing     Sexual [place]:  [place]: Pain mostly at entrance, but sometimes deeper. Internal stretching has helped, but not super consistent with doing it before every time   Does soreness last: Still having burning that can last hours to a day  Marinoff Dyspareunia Scale: 2 = Moderate pain, does not interrupt intercourse but more difficult    Still not having intercourse as much as she would like to d/t pain and discomfort   Lubricants: Using uberlube and it has helped    Able to orgasm: Still no issues with orgasming     HEP Compliance: Yes     Objective:    Isometric Abdominal Contraction:  Good TA contraction in HL    Strength:   Hip flex: R 5/5, L 5/5  Hip ABD: R 5/5, L 5/5  Hip ext: R 5/5, L 5/5  Knee ext: R 5/5, L 5/5  Knee flex: R 5/5, L 5/5    Pelvic Floor:  Vaginal  Assessment:  P: 4/5   E: 3 sec hold   R: 3 reps   F: 2 with full eccentric lengthening of PFM otherwise very uncoordinated and cannot achieve full relaxation during all quick flicks     Internal palpation: +TTP circumferentially at introitus and throughout PFM L>R. Burning noted on R and more discomfort/pain noted on L. Min-mod hypertonicity noted on R and mod hypertonicity noted on L.     Interventions:  PT Therapeutic Procedures Time Entry  Manual Therapy Time Entry: 15  Therapeutic Exercise Time Entry: 30    Education:   Pt educated on updated POC and to follow up with referring provider to discuss lack of progress with PFPT and other tx options that may help.   Pt instructed to continue with HEP, dilator use, and internal stretching at home.   Pt educated on how to seek out additional service PRN.     Therapeutic exercise:   Goals and subjective reassessed   Objective measures retaken   NIH CPSI provided to pt   HEP reviewed and discussed     Manual Therapy: 15 minutes  Internal vaginal exam - verbal consent obtained (see objective)   Internal stretching around vaginal clock   Nic's massage x 6 sweeps

## 2024-10-04 ENCOUNTER — APPOINTMENT (OUTPATIENT)
Dept: UROLOGY | Facility: CLINIC | Age: 29
End: 2024-10-04
Payer: COMMERCIAL

## 2024-10-11 ENCOUNTER — APPOINTMENT (OUTPATIENT)
Dept: UROLOGY | Facility: CLINIC | Age: 29
End: 2024-10-11
Payer: COMMERCIAL

## 2024-10-11 VITALS
TEMPERATURE: 97.5 F | BODY MASS INDEX: 21.71 KG/M2 | WEIGHT: 115 LBS | HEART RATE: 85 BPM | SYSTOLIC BLOOD PRESSURE: 110 MMHG | DIASTOLIC BLOOD PRESSURE: 66 MMHG | HEIGHT: 61 IN

## 2024-10-11 DIAGNOSIS — M62.89 HIGH-TONE PELVIC FLOOR DYSFUNCTION: ICD-10-CM

## 2024-10-11 DIAGNOSIS — R35.0 URINARY FREQUENCY: Primary | ICD-10-CM

## 2024-10-11 DIAGNOSIS — R30.0 DYSURIA: ICD-10-CM

## 2024-10-11 DIAGNOSIS — N94.10 DYSPAREUNIA IN FEMALE: ICD-10-CM

## 2024-10-11 DIAGNOSIS — N94.819 VULVODYNIA: ICD-10-CM

## 2024-10-11 PROCEDURE — 81003 URINALYSIS AUTO W/O SCOPE: CPT | Performed by: NURSE PRACTITIONER

## 2024-10-11 PROCEDURE — 99214 OFFICE O/P EST MOD 30 MIN: CPT | Performed by: NURSE PRACTITIONER

## 2024-10-11 PROCEDURE — 3008F BODY MASS INDEX DOCD: CPT | Performed by: NURSE PRACTITIONER

## 2024-10-11 NOTE — PROGRESS NOTES
10/11/24   [id number]    Chronic pelvic pain     Subjective      HPI Vicki Garcia is a 28 y.o. female who presents follow up chronic pelvic pain ~ 8 years; last seen 6/19/24;     30-40% better since last visit as severity of pain has decreased; pain multiple x per week after urination, at opening of urethra; tried the pelvic pain suppositories but noticed no difference; still dyspareunia;     High tone pelvic floor, wrapped up with [facility] PFPT, tried a lot of stuff, internally stretching, didn't make as much progress; recommended using the dilators, she feels they are uncomfortable;      exam 6/19/24 + qtip tenderness vestibule, neg cst, high tone pelvic floor, especially band at opening around vagina; no prolapse;    Feels frequency isn't as bad, more the background pain;     Last visit we discussed testosterone estrogen cream compounded but at the time we were trying several treatments, decided hold off until this visit on cream;     Amitriptyline-failed in past  Failed trospium dry mouth and constipation  Didn't feel myrbetriq made much difference, maybe not bladder spasms as first considered;     4-5 yrs on oral birth control, had IUD past 6 yrs until recently, PFPT thought perhaps might be better without IUD, she wanted to give try, back on oral birth control and anxious about the pain of having inserted again as well;     UA mod heme, on menses, PVR 0 cc    Long hx microscopic hematuria, told benign, PIERRE and cystoscopy in past two years;    Cystoscopy July 2021 w Dr. Perry  PIERRE in past normal 2021 ordered by HANDY Heller;     DTF 6-10 x, NTF 0 x, no leakage of urine, some urgency;    History reviewed  PMH none, microscopic hematuria w benign work up in past; normal cystoscopy 7/15/21; unremarkable PIERRE 5/8/21;   PSH none  FH thyroid cancer   [employer], customer services    Imported from last visit for history 4/29/24  hx pain for 8 yrs, saw Dr. Perry years ago (7/2021)  but didn't follow up;  "feels like she has UTI all the time, sometimes better if stays hydrated, burning w urination and stays longer, always some discomfort  w intimacy, last few mos symptoms worsening; urgicare for UTI few mos ago, culture negative;     DTF 10 x, NTF 0-1 x no leakage    PFPT 8 weeks in [facility] didn't feel it helped but didn't keep up w exercises; pain more w entry but kind of both entry and deep dyspareunia; she wondered if IC; no constipation;     Oral birth control in past; 2-4 yrs at age 16, stopped w cigarrettes;    Objective     /66   Pulse 85   Temp 36.4 °C (97.5 °F)   Ht 1.549 m (5' 1\")   Wt 52.2 kg (115 lb)   LMP  (LMP Unknown)   BMI 21.73 kg/m²      General: Appears comfortable and in no apparent distress, well nourished  Head: Normocephalic, atraumatic  Neck: trachea midline  Respiratory: respirations unlabored, no wheezes, and no use of accessory muscles  Cardiovascular: at rest no dyspnea, well perfused  Skin: no visible rashes or lesions  Neurologic: grossly intact, oriented to person, place, and time  Psychiatric: mood and affect appropriate  Musculoskeletal: in chair for appt. no difficulty w upper body movement      Assessment/Plan   Problem List Items Addressed This Visit    None  Visit Diagnoses       Urinary frequency    -  Primary    Relevant Orders    POCT UA Automated manually resulted (Completed)    Post-Void Residual (Completed)    High-tone pelvic floor dysfunction        Vulvodynia        Dyspareunia in female        Dysuria                Orders Placed This Encounter   Procedures    Post-Void Residual    POCT UA Automated manually resulted     Order Specific Question:   Release result to [facility]     Answer:   Immediate [1]      Plan:  Will see how she is without myrbetriq, maybe not bladder spasms, maybe this is all pelvic floor and vestibulodynia and would benefit from pelvic floor injections for pain/botox;     Finished PFPT, didn't get results desired, has dilators, " "still dyspareunia;     Has pelvic pain suppositories if desires to try again;   Amitriptyline-failed in past    Discussed testosterone/estrogen cream to vesibule, last visit, will send in now and see if this helps    Follow up Dr. Perry 6-8 weeks  Pelvic exam, discuss pelvic pain injections, maybe IUD under anesthesia;     Nurse line [phone]      Pallavi Byers, APRN-CNP  No results found for: \"GLUCOSE\", \"CALCIUM\", \"NA\", \"K\", \"CO2\", \"CL\", \"BUN\", \"CREATININE\"    "

## 2024-10-11 NOTE — PATIENT INSTRUCTIONS
Plan:  Will see how she is without myrbetriq, maybe not bladder spasms, maybe this is all pelvic floor and vestibulodynia and would benefit from pelvic floor injections for pain/botox;     Finished PFPT, didn't get results desired, has dilators, still dyspareunia;     Has pelvic pain suppositories if desires to try again;   Amitriptyline-failed in past    Discussed testosterone/estrogen cream to vesibule, last visit, will send in now and see if this helps    Follow up Dr. Perry 6-8 weeks  Pelvic exam, discuss pelvic pain injections, maybe IUD under anesthesia;     Nurse line [phone]

## 2024-10-14 ENCOUNTER — TELEPHONE (OUTPATIENT)
Dept: OBSTETRICS AND GYNECOLOGY | Facility: CLINIC | Age: 29
End: 2024-10-14
Payer: COMMERCIAL

## 2024-10-14 DIAGNOSIS — Z30.011 ENCOUNTER FOR INITIAL PRESCRIPTION OF CONTRACEPTIVE PILLS: ICD-10-CM

## 2024-10-16 RX ORDER — NORETHINDRONE ACETATE AND ETHINYL ESTRADIOL 1MG-20(21)
1 KIT ORAL DAILY
Qty: 90 TABLET | Refills: 3 | Status: SHIPPED | OUTPATIENT
Start: 2024-10-16

## 2024-10-16 NOTE — TELEPHONE ENCOUNTER
Pt states that she is a little confused because she has two different Rx listed as her birth control. The pt states that she was taking Aurovela Fe and now she see a Rx for Norethindrone (Marjorie Fe). Pt is not sure if the new Rx was the same as the old Rx just in another name or whether the Rx was changed altogether. Pt states that she would like clarification.

## 2024-10-16 NOTE — TELEPHONE ENCOUNTER
Called patient to discuss birth control questions  Identified by name and   Informed patient that mediation is same as before but just listed under different brand name  Patient verbalized understanding, all questions/concerns addressed at this time.    ANTONIO CollinsN RN

## 2025-01-09 ENCOUNTER — APPOINTMENT (OUTPATIENT)
Dept: UROLOGY | Facility: CLINIC | Age: 30
End: 2025-01-09
Payer: COMMERCIAL

## 2025-02-12 NOTE — PROGRESS NOTES
FOLLOW-UP VISIT       HISTORY OF PRESENT ILLNESS:   Vicki Garcia is a 29 y.o. female who presents to me today for follow-up of chronic pelvic pain. At her last visit with Pallavi Byers, patient reported her pain had improvement 30-40% but continued to experience dysuria as well as dyspareunia. They discussed having the patient discontinue Myrbetriq due to pain at the entrance of the vagina possibly due to vestibulodynia. They also discussed the possibility of pelvic floor Botox and trigger point injections.     Patient has previously tried PFPT with Bethany Paolo which did not provide relief. Reports discomfort using dilators. Also notes difficulty with using tampons which she no longer uses. Has been on Marjorie Fe for contraception for the past 4 months. Previously used an IUD for 6 years.     PAST MEDICAL HISTORY:  Past Medical History:   Diagnosis Date    Epilepsy     Urinary tract infection        PAST SURGICAL HISTORY:  No past surgical history on file.     ALLERGIES:   Allergies   Allergen Reactions    Amoxicillin Hives and Rash        MEDICATIONS:   Medication Documentation Review Audit       Reviewed by Courtney Alvarez MA (Medical Assistant) on 10/11/24 at 0849      Medication Order Taking? Sig Documenting Provider Last Dose Status   mirabegron (Myrbetriq) 50 mg tablet extended release 24 hr 24 hr tablet [id number] Yes Take 1 tablet (50 mg) by mouth once daily. Pallavi Byers, APRN-CNP Taking Active   norethindrone-e.estradioL-iron (Junel FE 1/20) 1 mg-20 mcg (21)/75 mg (7) tablet [id number] Yes Take 1 tablet by mouth once daily. Dona MACARIO MD Taking Active                     SOCIAL HISTORY:  Patient  reports that she has quit smoking. Her smoking use included cigarettes. She has a 2.5 pack-year smoking history. She uses smokeless tobacco. She reports current alcohol use of about 10.0 standard drinks of alcohol per week. She reports current drug use. Frequency: 7.00 times per week. Drug: Marijuana.    Social History     Socioeconomic History    Marital status: Significant Other     Spouse name: Not on file    Number of children: Not on file    Years of education: Not on file    Highest education level: Not on file   Occupational History    Not on file   Tobacco Use    Smoking status: Former     Current packs/day: 0.25     Average packs/day: 0.3 packs/day for 10.0 years (2.5 ttl pk-yrs)     Types: Cigarettes    Smokeless tobacco: Current   Substance and Sexual Activity    Alcohol use: Yes     Alcohol/week: 10.0 standard drinks of alcohol     Types: 10 Cans of beer per week    Drug use: Yes     Frequency: 7.0 times per week     Types: Marijuana    Sexual activity: Yes     Partners: Male     Birth control/protection: I.U.D.   Other Topics Concern    Not on file   Social History Narrative    Not on file     Social Drivers of Health     Financial Resource Strain: Not on file   Food Insecurity: Not on file   Transportation Needs: Not on file   Physical Activity: Not on file   Stress: Not on file   Social Connections: Not on file   Intimate Partner Violence: Not on file   Housing Stability: Not on file       FAMILY HISTORY:  Family History   Problem Relation Name Age of Onset    Cancer Father Dad     Diabetes Father Dad        REVIEW OF SYSTEMS:  Constitutional: Negative for fever and chills. Denies anorexia, weight loss.  Eyes: Negative for visual disturbance.   Respiratory: Negative for shortness of breath.    Cardiovascular: Negative for chest pain.   Gastrointestinal: Negative for nausea and vomiting.   Genitourinary: See interval history above.  Skin: Negative for rash.   Neurological: Negative for dizziness and numbness.   Psychiatric/Behavioral: Negative for confusion and decreased concentration.     PHYSICAL EXAM:  There were no vitals taken for this visit.  Virtual appointment, patient appears well.      Assessment:      1. Pelvic pain            Vicki Garcia is a 29 y.o. female with pelvic pain likely  due to vestibulodynia.     We discussed that her symptoms and physical exam are consistent with vestibulitis (localized vulvodynia). Vestibilitis can be caused by infetions such as yeast or HSV, neuroproliferation, hormones, or unknown causes of inflammation. The first line of treatment is vaginal estrogen. I typically recommend using this as a cream form as it is soothing. It can be applied in a pea-sized amount three times a week. If this is not enough relief, we can add lidocaine to the estrogen. If this still does not help, we can discuss a vesitbulectomy as it is highly effective and many patients are satisfied with the outcomes.     Commonly, vestibulitis is accompanied by pelvic floor dysfunction or myofascial pelvic pain. This can be improved with medical dilator use and resolution of the muscular trigger points. You can use the dilators 3 times a week for 10-15 minutues to apply gently pressure to the trigger points and help relax the muscles and bring the blood supply back to them. Use a water-based lubricant with the dilator.      Plan:   Prescription for Slynd sent to the patient's pharmacy.    Refer to PFPT  Recommended trying the e/t cream  Follow-up in 3-4 months with pelvic exam.       Vanna Perry MD MPH    Scribe Attestation  By signing my name below, IMariya, Scribe   attest that this documentation has been prepared under the direction and in the presence of Vanna Perry MD MPH.

## 2025-02-13 ENCOUNTER — APPOINTMENT (OUTPATIENT)
Dept: UROLOGY | Facility: CLINIC | Age: 30
End: 2025-02-13
Payer: COMMERCIAL

## 2025-02-13 ENCOUNTER — TELEMEDICINE (OUTPATIENT)
Dept: UROLOGY | Facility: CLINIC | Age: 30
End: 2025-02-13
Payer: COMMERCIAL

## 2025-02-13 DIAGNOSIS — Z30.9 ENCOUNTER FOR CONTRACEPTIVE MANAGEMENT, UNSPECIFIED TYPE: ICD-10-CM

## 2025-02-13 DIAGNOSIS — N94.819 VULVODYNIA: ICD-10-CM

## 2025-02-13 DIAGNOSIS — R10.2 PELVIC PAIN: ICD-10-CM

## 2025-02-13 PROCEDURE — 99213 OFFICE O/P EST LOW 20 MIN: CPT | Performed by: OBSTETRICS & GYNECOLOGY

## 2025-02-13 RX ORDER — NORETHINDRONE 0.35 MG/1
1 TABLET ORAL DAILY
Qty: 84 TABLET | Refills: 4 | Status: SHIPPED | OUTPATIENT
Start: 2025-02-13 | End: 2025-02-14 | Stop reason: ALTCHOICE

## 2025-02-13 RX ORDER — DROSPIRENONE 4 MG/1
4 TABLET, FILM COATED ORAL DAILY
Qty: 91 TABLET | Refills: 3 | Status: SHIPPED | OUTPATIENT
Start: 2025-02-13 | End: 2025-02-14 | Stop reason: WASHOUT

## 2025-06-04 NOTE — PROGRESS NOTES
FOLLOW-UP VISIT       HISTORY OF PRESENT ILLNESS:   Vicki Garcia is a 29 y.o. 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 Patient was last evaluated on 2/13/25. At that visit the patient was referred to PFPT and started on Slynd for contraception which was not covered by her insurance so she decided to stay with her current form of contraception (Marjorie Fe). Plan was to refer the patient to PFPT and to follow-up in 3-4 months with a pelvic exam.    Today the patient reports that her symptoms continue to persist since her last visit. States that she tried the compounded estrogen-testosterone cream and feels this provided very mild benefit, however she would like to discuss further treatment options.       Regarding her bladder symptoms, patient reports that her bladder spasms and urinary frequency have not resolved. She is unsure if she experiences pain with bladder fullness as her pain is intermittent. Has seen Pallavi Byers who told her that she did not think that she had IC due to her symptom of urinary frequency, however she has not tried first line medications for IC.     PAST MEDICAL HISTORY:  Medical History[1]    PAST SURGICAL HISTORY:  Surgical History[2]     ALLERGIES:   Allergies[3]     MEDICATIONS:   Medication Documentation Review Audit       Reviewed by Mary Nuñez MA (Medical Assistant) on 06/05/25 at 1557      Medication Order Taking? Sig Documenting Provider Last Dose Status   mirabegron (Myrbetriq) 50 mg tablet extended release 24 hr 24 hr tablet [id number] No Take 1 tablet (50 mg) by mouth once daily. Pallavi Byers, APRN-CNP Taking Active   norethindrone-e.estradioL-iron (Marjorie Fe 1/20, 28,) 1 mg-20 mcg (21)/75 mg (7) tablet [id number] Yes Take 1 tablet by mouth once daily. Dona MACARIO MD  Active                     SOCIAL HISTORY:  Patient  reports that she has quit smoking. Her smoking use included cigarettes. She has a 2.5 pack-year smoking  "history. She uses smokeless tobacco. She reports current alcohol use of about 10.0 standard drinks of alcohol per week. She reports current drug use. Frequency: 7.00 times per week. Drug: Marijuana.   Social History     Socioeconomic History    Marital status: Significant Other     Spouse name: Not on file    Number of children: Not on file    Years of education: Not on file    Highest education level: Not on file   Occupational History    Not on file   Tobacco Use    Smoking status: Former     Current packs/day: 0.25     Average packs/day: 0.3 packs/day for 10.0 years (2.5 ttl pk-yrs)     Types: Cigarettes    Smokeless tobacco: Current   Substance and Sexual Activity    Alcohol use: Yes     Alcohol/week: 10.0 standard drinks of alcohol     Types: 10 Cans of beer per week    Drug use: Yes     Frequency: 7.0 times per week     Types: Marijuana    Sexual activity: Yes     Partners: Male     Birth control/protection: I.U.D.   Other Topics Concern    Not on file   Social History Narrative    Not on file     Social Drivers of Health     Financial Resource Strain: Not on file   Food Insecurity: Not on file   Transportation Needs: Not on file   Physical Activity: Not on file   Stress: Not on file   Social Connections: Not on file   Intimate Partner Violence: Not on file   Housing Stability: Not on file       FAMILY HISTORY:  Family History[4]    REVIEW OF SYSTEMS:  Constitutional: Negative for fever and chills. Denies anorexia, weight loss.  Eyes: Negative for visual disturbance.   Respiratory: Negative for shortness of breath.    Cardiovascular: Negative for chest pain.   Gastrointestinal: Negative for nausea and vomiting.   Genitourinary: See interval history above.  Skin: Negative for rash.   Neurological: Negative for dizziness and numbness.   Psychiatric/Behavioral: Negative for confusion and decreased concentration.     PHYSICAL EXAM:  Blood pressure 115/80, pulse 75, height 1.549 m (5' 1\"), weight 53.5 kg (118 " lb).  Constitutional: Patient appears well-developed and well-nourished. No distress.    Head: Normocephalic and atraumatic.    Neck: Normal range of motion.    Cardiovascular: Normal rate.    Pulmonary/Chest: Effort normal. No respiratory distress.   : Pelvic floor muscles are overall non tender, the pubococcygeus muscle is slightly tender and tense BL. Cotton swab test was positive at 4, 6, and 7 o'clock.   Musculoskeletal: Normal range of motion.    Neurological: Alert and oriented to person, place, and time.  Psychiatric: Normal mood and affect. Behavior is normal. Thought content normal.       Assessment:      1. Vulvodynia        2. Dyspareunia in female        3. Pelvic pain            Vicki Garcia is a 29 y.o. female with pelvic pain and dyspareunia due to vestibulodynia and possible IC.     Vestibulodynia:  We discussed that her symptoms and physical exam are consistent with vestibulitis (localized vulvodynia). Vestibilitis can be caused by infetions such as yeast or HSV, neuroproliferation, hormones, or unknown causes of inflammation. The first line of treatment is vaginal estrogen. I typically recommend using this as a cream form as it is soothing. It can be applied in a pea-sized amount three times a week. If this is not enough relief, we can add lidocaine to the estrogen. If this still does not help, we can discuss a vesitbulectomy as it is highly effective and many patients are satisfied with the outcomes.     Commonly, vestibulitis is accompanied by pelvic floor dysfunction or myofascial pelvic pain. This can be improved with medical dilator use and resolution of the muscular trigger points. You can use the dilators 3 times a week for 10-15 minutues to apply gently pressure to the trigger points and help relax the muscles and bring the blood supply back to them. Use a water-based lubricant with the dilator.      IC:   We discussed that her symptoms are consistent with interstitial cystitis. I  recommend that she look at the website ichelp.com to identify triggers for IC flares in order to learn how to avoid them in her daily life (e.g. alcohol, caffeine, etc.) We also discuss the treatment for IC including antihistamines, anticholinergics, bladder instillations and hydrodistention, and that we will start with the least invasive and the treatment with least side effects and go from there.       Plan:   Prescription for hydroxyzine 25 mg tablets sent to the patient's pharmacy.   Prescription for compounded estrogen-testosterone cream sent to the compound pharmacy.    Patient will try to use dilators to keep pf muscle tension to a minimum up to 3 times a week.   We discussed exploring each potential cause of vestibulodynia as pelvic floor cause does not seem to be the issue  Follow-up virtually in 3 months.       I spent 30 minutes of dedicated E&M time, including preparation and review of records, notes, and data, time spent with patient/family, and documentation.    Vanna Perry MD MPH      Scribe Attestation  By signing my name below, IMariya Scribe   attest that this documentation has been prepared under the direction and in the presence of Vanna Perry MD MPH.          [1]   Past Medical History:  Diagnosis Date    Epilepsy     Urinary tract infection    [2] No past surgical history on file.  [3]   Allergies  Allergen Reactions    Amoxicillin Hives and Rash   [4]   Family History  Problem Relation Name Age of Onset    Cancer Father Dad     Diabetes Father Dad

## 2025-06-05 ENCOUNTER — APPOINTMENT (OUTPATIENT)
Dept: UROLOGY | Facility: CLINIC | Age: 30
End: 2025-06-05
Payer: COMMERCIAL

## 2025-06-05 VITALS
SYSTOLIC BLOOD PRESSURE: 115 MMHG | HEIGHT: 61 IN | HEART RATE: 75 BPM | WEIGHT: 118 LBS | DIASTOLIC BLOOD PRESSURE: 80 MMHG | BODY MASS INDEX: 22.28 KG/M2

## 2025-06-05 DIAGNOSIS — N30.10 INTERSTITIAL CYSTITIS: ICD-10-CM

## 2025-06-05 DIAGNOSIS — R10.2 PELVIC PAIN: ICD-10-CM

## 2025-06-05 DIAGNOSIS — N94.819 VULVODYNIA: Primary | ICD-10-CM

## 2025-06-05 DIAGNOSIS — N94.10 DYSPAREUNIA IN FEMALE: ICD-10-CM

## 2025-06-05 PROCEDURE — 3008F BODY MASS INDEX DOCD: CPT | Performed by: OBSTETRICS & GYNECOLOGY

## 2025-06-05 PROCEDURE — 99214 OFFICE O/P EST MOD 30 MIN: CPT | Performed by: OBSTETRICS & GYNECOLOGY

## 2025-06-06 RX ORDER — HYDROXYZINE HYDROCHLORIDE 25 MG/1
25 TABLET, FILM COATED ORAL NIGHTLY
Qty: 30 TABLET | Refills: 5 | Status: SHIPPED | OUTPATIENT
Start: 2025-06-06 | End: 2025-07-06

## 2025-06-13 ENCOUNTER — APPOINTMENT (OUTPATIENT)
Dept: UROLOGY | Facility: CLINIC | Age: 30
End: 2025-06-13
Payer: COMMERCIAL

## 2025-09-08 ENCOUNTER — APPOINTMENT (OUTPATIENT)
Dept: UROLOGY | Facility: CLINIC | Age: 30
End: 2025-09-08
Payer: COMMERCIAL